# Patient Record
(demographics unavailable — no encounter records)

---

## 2024-12-27 NOTE — HISTORY OF PRESENT ILLNESS
[FreeTextEntry1] : Patient is a 70-year-old female with past medical history significant for hypertension, former smoker, and peripheral arterial disease s/p right leg angio, atherectomy, and stent who presents to us for follow-up. Patient also reports she recently stopped smoking.

## 2024-12-27 NOTE — PHYSICAL EXAM
[JVD] : no jugular venous distention  [Normal Breath Sounds] : Normal breath sounds [Normal Rate and Rhythm] : normal rate and rhythm [2+] : left 2+ [Ankle Swelling (On Exam)] : not present [Varicose Veins Of Lower Extremities] : not present [] : not present [No Rash or Lesion] : No rash or lesion [Alert] : alert [Calm] : calm [de-identified] : Appears well, no acute distress noted [de-identified] : Intact

## 2024-12-27 NOTE — ASSESSMENT
[FreeTextEntry1] : 70-year-old female with past medical history significant for hypertension, former smoker, and peripheral arterial disease s/p right leg angio, artherectomy, and stent  In the office today, patient underwent duplex which demonstrated a patent right SFA-pop stent.  Patient to continue conservative management with aspirin and Plavix.  Patient has stopped smoking. The importance of maintaining smoking cessation was reinforced.  Follow-up 3 months with repeat duplex.   [Smoking Cessation] : smoking cessation

## 2025-07-11 NOTE — PHYSICAL EXAM
[JVD] : no jugular venous distention  [Normal Breath Sounds] : Normal breath sounds [Normal Rate and Rhythm] : normal rate and rhythm [2+] : left 2+ [Ankle Swelling (On Exam)] : not present [Varicose Veins Of Lower Extremities] : not present [] : not present [No Rash or Lesion] : No rash or lesion [Alert] : alert [Calm] : calm [de-identified] : Appears well, no acute distress noted [de-identified] : Intact